# Patient Record
Sex: MALE | Race: BLACK OR AFRICAN AMERICAN | Employment: FULL TIME | URBAN - NONMETROPOLITAN AREA
[De-identification: names, ages, dates, MRNs, and addresses within clinical notes are randomized per-mention and may not be internally consistent; named-entity substitution may affect disease eponyms.]

---

## 2024-02-13 ENCOUNTER — HOSPITAL ENCOUNTER (EMERGENCY)
Age: 29
Discharge: HOME | End: 2024-02-13

## 2024-02-13 VITALS
BODY MASS INDEX: 28.88 KG/M2 | RESPIRATION RATE: 17 BRPM | HEIGHT: 74 IN | OXYGEN SATURATION: 97 % | HEART RATE: 91 BPM | TEMPERATURE: 97.5 F | WEIGHT: 225 LBS

## 2024-02-13 DIAGNOSIS — R04.0 EPISTAXIS: Primary | ICD-10-CM

## 2024-02-13 PROCEDURE — 99202 OFFICE O/P NEW SF 15 MIN: CPT | Performed by: NURSE PRACTITIONER

## 2024-02-13 PROCEDURE — 99213 OFFICE O/P EST LOW 20 MIN: CPT

## 2024-02-13 PROCEDURE — 6370000000 HC RX 637 (ALT 250 FOR IP): Performed by: NURSE PRACTITIONER

## 2024-02-13 RX ORDER — SODIUM CHLORIDE/ALOE VERA
GEL (GRAM) NASAL PRN
Refills: 3 | COMMUNITY
Start: 2024-02-13

## 2024-02-13 RX ADMIN — PHENYLEPHRINE HYDROCHLORIDE 1 SPRAY: 1 SPRAY NASAL at 17:16

## 2024-02-13 NOTE — ED PROVIDER NOTES
[facility] URGENT CARE  Urgent Care Encounter       CHIEF COMPLAINT       Chief Complaint   Patient presents with    Epistaxis     x1hr       Nurses Notes reviewed and I agree except as noted in the HPI.  HISTORY OF PRESENT ILLNESS   Tae Amos is a 28 y.o. male who presents with with complaints of a nosebleed that started 1 hour ago.  This is a recurrent problem.  His last episode was 10 years ago.  1 hour ago admits to sneezing causing a constant nosebleed.  He has been holding pressure.  There has been no relief.  He does have a history of nosebleeds in the past with 1 episode requiring cauterization in the ER.  Admits to mild nausea but denies any vomiting.  Denies any trouble breathing or swallowing.    The history is provided by the patient.       REVIEW OF SYSTEMS     Review of Systems   Constitutional:  Negative for fever.   HENT:  Positive for nosebleeds.    Respiratory:  Negative for cough and shortness of breath.    Cardiovascular:  Negative for chest pain.   Gastrointestinal:  Positive for nausea. Negative for abdominal pain and vomiting.   Neurological:  Negative for dizziness and headaches.       PAST MEDICAL HISTORY         Diagnosis Date    Low iron        SURGICALHISTORY     Patient  has no past surgical history on file.    CURRENT MEDICATIONS       Discharge Medication List as of 2/13/2024  5:14 PM          ALLERGIES     Patient is has No Known Allergies.    Patients   There is no immunization history on file for this patient.    FAMILY HISTORY     Patient's family history is not on file.    SOCIAL HISTORY     Patient  reports that he has never smoked. He has never used smokeless tobacco. He reports current drug use. Drug: Marijuana (Weed). He reports that he does not drink alcohol.    PHYSICAL EXAM     ED TRIAGE VITALS   , Temp: 97.5 °F (36.4 °C), Pulse: 91, Respirations: 17, SpO2: 97 %,Estimated body mass index is 28.89 kg/m² as calculated from the following:    Height as of

## 2024-02-13 NOTE — ED TRIAGE NOTES
Patient comes in with epistaxis for approximately 1 hr. Patient states he has had large blood clots come out of his right nostril. Patient is diaphoretic and has some nausea. Clamp applied to patients nose at this time.

## 2024-02-14 ENCOUNTER — HOSPITAL ENCOUNTER (EMERGENCY)
Age: 29
Discharge: HOME | End: 2024-02-14
Attending: STUDENT IN AN ORGANIZED HEALTH CARE EDUCATION/TRAINING PROGRAM

## 2024-02-14 VITALS
OXYGEN SATURATION: 98 % | DIASTOLIC BLOOD PRESSURE: 95 MMHG | HEIGHT: 74 IN | BODY MASS INDEX: 28.88 KG/M2 | HEART RATE: 83 BPM | TEMPERATURE: 97.9 F | SYSTOLIC BLOOD PRESSURE: 154 MMHG | WEIGHT: 225 LBS | RESPIRATION RATE: 16 BRPM

## 2024-02-14 DIAGNOSIS — R04.0 EPISTAXIS: Primary | ICD-10-CM

## 2024-02-14 LAB
BASOPHILS ABSOLUTE: 0 THOU/MM3 (ref 0–0.1)
BASOPHILS NFR BLD AUTO: 0.3 %
DEPRECATED RDW RBC AUTO: 31.3 FL (ref 35–45)
EOSINOPHIL NFR BLD AUTO: 0.3 %
EOSINOPHILS ABSOLUTE: 0 THOU/MM3 (ref 0–0.4)
ERYTHROCYTE [DISTWIDTH] IN BLOOD BY AUTOMATED COUNT: 11.4 % (ref 11.5–14.5)
HCT VFR BLD AUTO: 37.6 % (ref 42–52)
HGB BLD-MCNC: 12.2 GM/DL (ref 14–18)
IMM GRANULOCYTES # BLD AUTO: 0.01 THOU/MM3 (ref 0–0.07)
IMM GRANULOCYTES NFR BLD AUTO: 0.3 %
LYMPHOCYTES ABSOLUTE: 0.9 THOU/MM3 (ref 1–4.8)
LYMPHOCYTES NFR BLD AUTO: 24.6 %
MCH RBC QN AUTO: 25 PG (ref 26–33)
MCHC RBC AUTO-ENTMCNC: 32.4 GM/DL (ref 32.2–35.5)
MCV RBC AUTO: 77 FL (ref 80–94)
MONOCYTES ABSOLUTE: 0.5 THOU/MM3 (ref 0.4–1.3)
MONOCYTES NFR BLD AUTO: 13.4 %
NEUTROPHILS NFR BLD AUTO: 61.1 %
NRBC BLD AUTO-RTO: 0 /100 WBC
PLATELET # BLD AUTO: 160 THOU/MM3 (ref 130–400)
PMV BLD AUTO: 9.4 FL (ref 9.4–12.4)
RBC # BLD AUTO: 4.88 MILL/MM3 (ref 4.7–6.1)
SEGMENTED NEUTROPHILS ABSOLUTE COUNT: 2.3 THOU/MM3 (ref 1.8–7.7)
WBC # BLD AUTO: 3.7 THOU/MM3 (ref 4.8–10.8)

## 2024-02-14 PROCEDURE — 6370000000 HC RX 637 (ALT 250 FOR IP): Performed by: STUDENT IN AN ORGANIZED HEALTH CARE EDUCATION/TRAINING PROGRAM

## 2024-02-14 PROCEDURE — 2500000003 HC RX 250 WO HCPCS: Performed by: STUDENT IN AN ORGANIZED HEALTH CARE EDUCATION/TRAINING PROGRAM

## 2024-02-14 PROCEDURE — 85025 COMPLETE CBC W/AUTO DIFF WBC: CPT

## 2024-02-14 PROCEDURE — 99283 EMERGENCY DEPT VISIT LOW MDM: CPT

## 2024-02-14 RX ORDER — OXYMETAZOLINE HYDROCHLORIDE 0.05 G/100ML
3 SPRAY NASAL ONCE
Status: COMPLETED | OUTPATIENT
Start: 2024-02-14 | End: 2024-02-14

## 2024-02-14 RX ORDER — TRANEXAMIC ACID 100 MG/ML
1000 INJECTION, SOLUTION INTRAVENOUS ONCE
Status: COMPLETED | OUTPATIENT
Start: 2024-02-14 | End: 2024-02-14

## 2024-02-14 RX ADMIN — OXYMETAZOLINE HCL 3 SPRAY: 0.05 SPRAY NASAL at 04:27

## 2024-02-14 RX ADMIN — TRANEXAMIC ACID 1000 MG: 100 INJECTION, SOLUTION INTRAVENOUS at 04:28

## 2024-02-14 NOTE — ED PROVIDER NOTES
placement of Merisel to ensure the patient was not bleeding down the back of his throat.  He was not.  As result, I felt he was safe to discharge home.    Shared Decision-Making was performed, disposition discussed with the patient/family and questions answered.      Outpatient follow up (If applicable):  Fredis Quintana MD  [address] [id number]  [phone]    Call today  For follow up              FINAL DIAGNOSES:  Final diagnoses:   Epistaxis       Condition: condition: stable  Dispo: Discharge to home  DISPOSITION Decision To Discharge 02/14/2024 04:27:39 AM      This transcription was electronically signed. It was dictated by use of voice recognition software and electronically transcribed. The transcription may contain errors not detected in proofreading.       Tramaine Lorenz,   02/16/24 [id number]

## 2024-02-14 NOTE — ED NOTES
This RN to pt room. Pt noted to be leaning over trash can with blood constantly dripping out of pt nose despite trying to use a tissue. New rag provided and nose clamp applied. Provider made aware.

## 2024-02-14 NOTE — ED TRIAGE NOTES
Patient presents to ED from home with complaints of nose bleed. Patient states that he was seen at the urgent care for same thing a few hours ago. States doctor told him it was due to dry air and \"busted capillaries\" and sent home with nasal spray. Patient states bleeding was controlled when he left then started again. Patient states that this has happened a few times in the past and needed to be cauterized. Bleeding controlled at this time. Patient is alert and oriented, ambulatory, VSS.

## 2024-02-15 ENCOUNTER — TELEPHONE (OUTPATIENT)
Dept: ENT CLINIC | Age: 29
End: 2024-02-15

## 2024-02-15 ENCOUNTER — TELEPHONE (OUTPATIENT)
Dept: ADMINISTRATIVE | Facility: CLINIC | Age: 29
End: 2024-02-15

## 2024-02-15 NOTE — TELEPHONE ENCOUNTER
Spoke to Carly and Smooth regarding patient and him possibly being packed. They said with his age and not being on any blood thinners we could see him tomorrow to remove the nasal packing. Smooth said he would see the patient at either 11:00 or 1:00 tomorrow.     Attempted to call patient. Got voicemail, left message to call the office.

## 2024-02-15 NOTE — TELEPHONE ENCOUNTER
Patient left message on clinical voicemail stating he was in [facility] and [facility] ER for nosebleeds. Please advise as to when patient needs to be seen.

## 2024-02-15 NOTE — TELEPHONE ENCOUNTER
Last UC visit with providers note unfinished at this time; seems as though patient had cautery performed to nose and no packing placed.  Okay for patient to be seen at next available with next available provider.   Advise him to utilize Afrin nasal spray 3 sprays 3 times per day for 3 days to the nose that was cauterized to assist with vasoconstriction and healing after cauterization and avoid blowing/scratching nose. After these three days can transition to nasal saline gel.

## 2024-02-15 NOTE — TELEPHONE ENCOUNTER
Patient returned my call. Patient scheduled with Smooth at 11:00 tomorrow. Patient did confirm he has a nasal packing in.

## 2024-02-15 NOTE — TELEPHONE ENCOUNTER
Per Referral Dept phone encounter:     Diana Christina       2/15/24  2:19 PM  Note      Patient called stating that he left a message with the ENT office (voice mail) and hasn't received a call back. I advised patient that ENT office has 48 hours to respond - he said that he is just concerned as he is seeing bleeding coming through his nose packing, and is trying to get scheduled asap. Please call him back [phone].   Thank you

## 2024-02-16 ENCOUNTER — OFFICE VISIT (OUTPATIENT)
Dept: ENT CLINIC | Age: 29
End: 2024-02-16

## 2024-02-16 VITALS
DIASTOLIC BLOOD PRESSURE: 80 MMHG | OXYGEN SATURATION: 99 % | TEMPERATURE: 97.6 F | RESPIRATION RATE: 16 BRPM | HEART RATE: 61 BPM | WEIGHT: 231.8 LBS | HEIGHT: 74 IN | BODY MASS INDEX: 29.75 KG/M2 | SYSTOLIC BLOOD PRESSURE: 132 MMHG

## 2024-02-16 DIAGNOSIS — J34.2 NASAL SEPTAL DEVIATION: ICD-10-CM

## 2024-02-16 DIAGNOSIS — R04.0 EPISTAXIS: Primary | ICD-10-CM

## 2024-02-16 NOTE — PATIENT INSTRUCTIONS
-Afrin (oxymetazoline) use 3 sprays to the right nostril, three times a day for 3. After three days, stop the Afrin and transition to nasal saline  -Use 2 sprays of nasal saline to both nostrils 2 times daily    If you have a nosebleed at home, you should try the following:    Lightly blow your nose in attempts to dislodge old blood and blood clots  Spray 3 sprays of Afrin (oxymetazoline) to the bleeding nostril. If unsure which nostril is bleeding, spray both nostrils  Apply direct pressure to the nose, compressing both nostrils completely shut. You can use your fingers or nasal clamp  Hold pressure with your fingers/nasal clamp for at least 15 minutes. Do not release pressure to check if bleeding has resolved before that.  If bleeding has not resolved after this, you can repeat steps 1-4. You can do this up to 3 times consecutively, but if bleeding still persists you should consider being evaluated in the ER or ENT office.

## 2024-02-16 NOTE — PROGRESS NOTES
history.     MEDICATIONS:  Current Outpatient Medications   Medication Sig Dispense Refill    saline nasal gel (AYR) GEL by Nasal route as needed for Congestion  3     No current facility-administered medications for this visit.       Objective:   /80 (Site: Right Upper Arm, Position: Sitting)   Pulse 61   Temp 97.6 °F (36.4 °C) (Infrared)   Resp 16   Ht 1.88 m (6' 2.02\")   Wt 105.1 kg (231 lb 12.8 oz)   SpO2 99%   BMI 29.75 kg/m²     PHYSICAL EXAM  Constitutional: Oriented and cooperative. Appears well-developed and well-nourished. No distress.   HENT:   Head: Normocephalic and atraumatic.   Nose:  External nose normal. Merocel packing half way out of the nose, essentially completed saturated with bloody coagulum. No signs of active bleeding around the packing. Nasal mucosa clear on the left, see procedure note below for right sided findings. Septum deviated to the left. Turbinates pink . clear nasal discharge in left.  Mouth/Throat:  Fair dentition. Oral cavity mucosa normal, no masses or lesions noted. Oropharynx is clear and moist. Hard and soft palate  symmetrical and intact  Eyes:  Pupils are equal, round, and reactive to light. Conjunctivae and EOM are normal.   Neck:  Normal range of motion. Neck supple. No JVD present. No tracheal deviation present. No thyromegaly present. No cervical lymphadenopathy or neck masses/lesion noted with palpation. Parotid and submandibular glands normal and symmetric with palpation.  Cardiovascular:  Normal rate.   Pulmonary/Chest:  Effort normal. No stridor or stertor. No respiratory distress.   Musculoskeletal:  Normal range of motion. No edema or lymphadenopathy.  Neurological:  Alert and answers questions appropriately, cooperative with exam.   Cranial nerve II-XII grossly intact.  Skin:  Skin is warm. No erythema.   Psychiatric:  Normal mood and affect. Behavior is normal.     Procedure:  Merocel packing removed from the right nare. Nearly fully saturated